# Patient Record
Sex: FEMALE | Race: WHITE | NOT HISPANIC OR LATINO | Employment: UNEMPLOYED | URBAN - METROPOLITAN AREA
[De-identification: names, ages, dates, MRNs, and addresses within clinical notes are randomized per-mention and may not be internally consistent; named-entity substitution may affect disease eponyms.]

---
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
## 2025-07-01 ENCOUNTER — OFFICE VISIT (OUTPATIENT)
Dept: URGENT CARE | Facility: URGENT CARE | Age: 20
End: 2025-07-01
Payer: COMMERCIAL

## 2025-07-01 VITALS
SYSTOLIC BLOOD PRESSURE: 124 MMHG | BODY MASS INDEX: 21.29 KG/M2 | OXYGEN SATURATION: 99 % | WEIGHT: 140 LBS | TEMPERATURE: 98.2 F | DIASTOLIC BLOOD PRESSURE: 71 MMHG | HEART RATE: 71 BPM | RESPIRATION RATE: 16 BRPM

## 2025-07-01 DIAGNOSIS — B88.0 BITES, CHIGGER: Primary | ICD-10-CM

## 2025-07-01 DIAGNOSIS — T14.8XXA BRUISING: ICD-10-CM

## 2025-07-01 PROCEDURE — 99203 OFFICE O/P NEW LOW 30 MIN: CPT | Performed by: PHYSICIAN ASSISTANT

## 2025-07-01 PROCEDURE — 3074F SYST BP LT 130 MM HG: CPT | Performed by: PHYSICIAN ASSISTANT

## 2025-07-01 PROCEDURE — 3078F DIAST BP <80 MM HG: CPT | Performed by: PHYSICIAN ASSISTANT

## 2025-07-01 RX ORDER — TRIAMCINOLONE ACETONIDE 1 MG/G
CREAM TOPICAL
COMMUNITY
Start: 2025-06-29

## 2025-07-01 ASSESSMENT — ENCOUNTER SYMPTOMS
COUGH: 0
WHEEZING: 0
VOMITING: 0
FEVER: 0
NAUSEA: 0
SHORTNESS OF BREATH: 0
DIARRHEA: 0

## 2025-07-01 NOTE — PROGRESS NOTES
Urgent Care Clinic Visit    Chief Complaint   Patient presents with    Rash     Rash both leg Thursday.               7/1/2025     5:21 PM   Additional Questions   Roomed by Mando Mckenna MA   Accompanied by Cassieienrommel Mckenna MA on 7/1/2025 at 5:22 PM

## 2025-07-01 NOTE — PROGRESS NOTES
Patient presents with:  Rash: Rash both leg since Thursday.      Clinical Decision Making: Red papules on bilateral legs and ankles consistent with chigger bites, especially given recent outdoor activities. Patient already prescribed triamcinolone cream for itchiness, which appears to be helping the rash. Bruising on the back of the left leg is mild and likely due to itching as there are multiple papules in that area. Patient reassured this will likely resolve on its own and to continue using the steroid cream for itchiness. Patient to return if bruising spreads or if symptoms are not improving.       ICD-10-CM    1. Bites, chigger  B88.0       2. Bruising  T14.8XXA           There are no Patient Instructions on file for this visit.    HPI:  Mariola Rincon is a 20 year old female who presents today with concerns of an itchy rash since Thursday and bruising that started today. Patient first noticed the rash on her ankles and it has been slowly moving up her legs. Patient did a [facility] appointment 2 days ago and was prescribed triamcinolone cream, which appears to be helping the rash. Since then, patient has been concerned about bruising that has appeared today on her upper legs. Denies fever, SOB, chest pain. Denies any new soaps or detergents. [place] airbnb [place]     History obtained from the patient.    Problem List:  There are no relevant problems documented for this patient.      No past medical history on file.    Social History     Tobacco Use    Smoking status: Never    Smokeless tobacco: Never   Substance Use Topics    Alcohol use: Not on file         Review of Systems   Constitutional:  Negative for fever.   HENT:  Negative for congestion.    Respiratory:  Negative for cough, shortness of breath and wheezing.    Cardiovascular:  Negative for chest pain.   Gastrointestinal:  Negative for diarrhea, nausea and vomiting.   Skin:  Positive for rash.        Bruising on upper  left leg that started today       Vitals:    07/01/25 1722   BP: 124/71   Pulse: 71   Resp: 16   Temp: 98.2  F (36.8  C)   TempSrc: Oral   SpO2: 99%   Weight: 63.5 kg (140 lb)       Physical Exam  Skin:     Findings: Bruising and rash present.      Comments: Mild bruising present on the back side of left thigh, consistent with itching causing bruising. No hematoma. Scattered red papules on bilateral lower ankles and legs.    Neurological:      Mental Status: She is alert.         At the end of the encounter, I discussed results, diagnosis, medications. Discussed red flags for immediate return to clinic/ER, as well as indications for follow up if no improvement. Patient understood and agreed to plan. Patient was stable for discharge.